# Patient Record
Sex: FEMALE | Race: WHITE
[De-identification: names, ages, dates, MRNs, and addresses within clinical notes are randomized per-mention and may not be internally consistent; named-entity substitution may affect disease eponyms.]

---

## 2020-02-13 ENCOUNTER — HOSPITAL ENCOUNTER (EMERGENCY)
Dept: HOSPITAL 74 - JER | Age: 75
Discharge: HOME | End: 2020-02-13
Payer: COMMERCIAL

## 2020-02-13 VITALS — DIASTOLIC BLOOD PRESSURE: 51 MMHG | TEMPERATURE: 100.1 F | HEART RATE: 92 BPM | SYSTOLIC BLOOD PRESSURE: 104 MMHG

## 2020-02-13 VITALS — BODY MASS INDEX: 20.8 KG/M2

## 2020-02-13 DIAGNOSIS — I10: ICD-10-CM

## 2020-02-13 DIAGNOSIS — J06.9: Primary | ICD-10-CM

## 2020-02-13 DIAGNOSIS — E78.00: ICD-10-CM

## 2020-02-13 LAB
ALBUMIN SERPL-MCNC: 3.2 G/DL (ref 3.4–5)
ALP SERPL-CCNC: 69 U/L (ref 45–117)
ALT SERPL-CCNC: 49 U/L (ref 13–61)
ANION GAP SERPL CALC-SCNC: 9 MMOL/L (ref 8–16)
AST SERPL-CCNC: 78 U/L (ref 15–37)
BASOPHILS # BLD: 0.2 % (ref 0–2)
BILIRUB SERPL-MCNC: 0.4 MG/DL (ref 0.2–1)
BUN SERPL-MCNC: 28.9 MG/DL (ref 7–18)
CALCIUM SERPL-MCNC: 8.6 MG/DL (ref 8.5–10.1)
CHLORIDE SERPL-SCNC: 97 MMOL/L (ref 98–107)
CO2 SERPL-SCNC: 26 MMOL/L (ref 21–32)
CREAT SERPL-MCNC: 1.2 MG/DL (ref 0.55–1.3)
DEPRECATED RDW RBC AUTO: 14.6 % (ref 11.6–15.6)
EOSINOPHIL # BLD: 0 % (ref 0–4.5)
GLUCOSE SERPL-MCNC: 138 MG/DL (ref 74–106)
HCT VFR BLD CALC: 44.1 % (ref 32.4–45.2)
HGB BLD-MCNC: 14.7 GM/DL (ref 10.7–15.3)
LYMPHOCYTES # BLD: 15.7 % (ref 8–40)
MCH RBC QN AUTO: 26.1 PG (ref 25.7–33.7)
MCHC RBC AUTO-ENTMCNC: 33.3 G/DL (ref 32–36)
MCV RBC: 78.4 FL (ref 80–96)
MONOCYTES # BLD AUTO: 11.7 % (ref 3.8–10.2)
NEUTROPHILS # BLD: 72.4 % (ref 42.8–82.8)
PLATELET # BLD AUTO: 169 K/MM3 (ref 134–434)
PMV BLD: 8.7 FL (ref 7.5–11.1)
POTASSIUM SERPLBLD-SCNC: 3.3 MMOL/L (ref 3.5–5.1)
PROT SERPL-MCNC: 7 G/DL (ref 6.4–8.2)
RBC # BLD AUTO: 5.63 M/MM3 (ref 3.6–5.2)
SODIUM SERPL-SCNC: 133 MMOL/L (ref 136–145)
WBC # BLD AUTO: 7.1 K/MM3 (ref 4–10)

## 2020-02-13 PROCEDURE — 3E0F7GC INTRODUCTION OF OTHER THERAPEUTIC SUBSTANCE INTO RESPIRATORY TRACT, VIA NATURAL OR ARTIFICIAL OPENING: ICD-10-PCS

## 2020-02-13 PROCEDURE — 3E0333Z INTRODUCTION OF ANTI-INFLAMMATORY INTO PERIPHERAL VEIN, PERCUTANEOUS APPROACH: ICD-10-PCS

## 2020-02-13 NOTE — PDOC
*Physical Exam





- Vital Signs


 Last Vital Signs











Temp Pulse Resp BP Pulse Ox


 


 100.1 F H  92 H  20   104/51 L  93 L


 


 02/13/20 13:01  02/13/20 13:01  02/13/20 13:01  02/13/20 13:01  02/13/20 13:01














- Physical Exam


General Appearance: Yes: Nourished, Appropriately Dressed.  No: Apparent 

Distress


HEENT: positive: Normal ENT Inspection


Neck: positive: Supple


Respiratory/Chest: negative: Respiratory Distress, Accessory Muscle Use


Cardiovascular: positive: Regular Rhythm, Regular Rate


Musculoskeletal: positive: Normal Inspection


Extremity: positive: Normal Inspection


Integumentary: positive: Normal Color


Neurologic: positive: Fully Oriented, Alert, Normal Response





ED Treatment Course





- LABORATORY


CBC & Chemistry Diagram: 


 02/13/20 15:00





 02/13/20 15:00





- Medications


Given in the ED: 


ED Medications














Discontinued Medications














Generic Name Dose Route Start Last Admin





  Trade Name Freq  PRN Reason Stop Dose Admin


 


Acetaminophen  650 mg  02/13/20 14:13  02/13/20 14:14





  Tylenol -  PO  02/13/20 14:14  650 mg





  ONCE ONE   Administration





     





     





     





     


 


Albuterol/Ipratropium  1 amp  02/13/20 14:13  02/13/20 14:14





  Duoneb -  NEB  02/13/20 14:14  1 amp





  ONCE ONE   Administration





     





     





     





     


 


Albuterol/Ipratropium  1 amp  02/13/20 14:59  02/13/20 15:01





  Duoneb -  NEB  02/13/20 15:00  1 amp





  ONCE ONE   Administration





     





     





     





     


 


Methylprednisolone Sodium Succinate  125 mg  02/13/20 14:59  02/13/20 15:28





  Solu-Medrol -  IVPUSH  02/13/20 15:00  125 mg





  ONCE ONE   Administration





     





     





     





     














Medical Decision Making





- Medical Decision Making





02/13/20 15:44


I assumed care of this 74-year-old female with history of hypertension and 

remote history of smoking presented with complaint of vague symptoms of cough 

and feeling sick with malaise and found to have low temp fever in triage and 

low O2 sat.  Patient given a dose of DuoNeb in fast track with no improvement 

in O2 sat.  Patient in no acute respiratory distress and report no history of 

COPD.  Patient with no wheezing.  Chest x-ray done from fast track shows no 

acute infiltrate or pneumonia.  Patient pending labs and flu result.  Steroids 

ordered from fast track to help with O2 sat.  Treat based on lab results





02/13/20 16:01


Patient pending labs results. Patient asymptomatic and signed out to TEE Scales 

for f/u care





Discharge





- Discharge Information


Problems reviewed: Yes


Clinical Impression/Diagnosis: 


URI (upper respiratory infection)


Qualifiers:


 URI type: unspecified URI Qualified Code(s): J06.9 - Acute upper respiratory 

infection, unspecified





Condition: Stable


Disposition: HOME





- Admission


No





- Follow up/Referral


Referrals: 


Claude Mustafa MD, MD [Staff Physician] - 





- Patient Discharge Instructions


Additional Instructions: 


Call to schedule an appointment with the pulmonologist  within 1 week


Return to ER if fever, chills, shortness of breath, chest pain or any worsening 

symptom





- Post Discharge Activity

## 2020-02-13 NOTE — PDOC
History of Present Illness





- General


Chief Complaint: Cold Symptoms


Stated Complaint: Cold Symptoms


Time Seen by Provider: 02/13/20 13:49


History Source: Patient





- History of Present Illness


Initial Comments: 





02/13/20 14:24


Chief complaint: Cough





Patient is 74-year-old female with history of hypertension, former smoker for 

many years ago with vague complaint of cough, not feeling well.  Denies fever 

although patient was febrile in triage.  Patient denies any chest pain or 

shortness of breath.  Patient does not appear acutely ill.





GENERAL/CONSTITUTIONAL: No fever, weakness. dizziness


HEAD, EYES, EARS, NOSE AND THROAT: No change in vision. No ear pain or 

discharge. No sore throat.


CARDIOVASCULAR: No chest pain


RESPIRATORY: No shortness of breath +cough


GASTROINTESTINAL: No pain, nausea, vomiting, diarrhea or constipation


GENITOURINARY: No dysuria


MUSCULOSKELETAL:  No neck or back pain


SKIN: No rash


NEUROLOGIC: No headache, vertigo, loss of consciousness, or loss of sensation.








GENERAL: The patient is awake, alert, and fully oriented, in no acute distress.


HEAD: Normal with no signs of trauma.


EYES: Pupils equal, round and reactive to light, sclera anicteric, conjunctiva 

clear.


ENT: pharynx: no erythema, no exudate, uvula midline


NECK: supple


CHEST: clear, decreased, nontender, rr


ABD: soft, nontender


BACK: no tenderness or signs of injury


EXTREMITIES: Normal range of motion, no edema.


NEUROLOGICAL: Normal speech, normal gait.


SKIN: Warm, Dry





Past History





- Past Medical History


Allergies/Adverse Reactions: 


 Allergies











Allergy/AdvReac Type Severity Reaction Status Date / Time


 


No Known Drug Allergies Allergy   Verified 02/13/20 12:59











Home Medications: 


Ambulatory Orders





Olmesartan/Hydrochlorothiazide [Benicar Hct 20-12.5 mg Tablet] 1 each PO DAILY 

03/02/15 


Rosuvastatin Calcium [Crestor] 5 mg PO DAILY 03/02/15 


Meloxicam [Mobic] 7.5 mg PO DAILY 04/09/15 








COPD: No


HTN: Yes


Hypercholesterolemia: Yes





- Surgical History


Abdominal Surgery: Yes (LEFT INGUINAL HERNIA REPAIR)


Appendectomy: Yes





- Psycho Social/Smoking Cessation Hx


Smoking History: Current some day smoker


Have you smoked in the past 12 months: Yes


Number of Cigarettes Smoked Daily: 4


Information on smoking cessation initiated: No


Hx Alcohol Use: No


Drug/Substance Use Hx: No


Substance Use Type: None





*Physical Exam





- Vital Signs


 Last Vital Signs











Temp Pulse Resp BP Pulse Ox


 


 100.1 F H  92 H  20   104/51 L  93 L


 


 02/13/20 13:01  02/13/20 13:01  02/13/20 13:01  02/13/20 13:01  02/13/20 13:01














ED Treatment Course





- LABORATORY


CBC & Chemistry Diagram: 


 02/13/20 15:00





 02/13/20 15:00





Medical Decision Making





- Medical Decision Making





02/13/20 14:26


74-year-old female with history of elevated blood pressure, who is had 3 days 

of not feeling well, cough.  No shortness of breath or chest pain..  Patient 

has low-grade fever, slightly tachycardic, no signs of respiratory distress, 

patient able to speak in full sentences.  Patient's pulse ox is a little on the 

low side, it was repeated and remained there.  Patient will get chest x-ray.  3 

days of symptoms so out of the window for Tamiflu.  Will give DuoNeb and 

reassess.





Chest x-ray reviewed by me, no gross signs of failure or infiltrate.  awaiting 

radiologist read. Patient's pulse ox remains 93.  Will order labs, EKG, give 

steroids, signed out to LILI OLIVEIRA for further evaluation.  Flu is pending


02/13/20 17:47








Discharge





- Discharge Information


Problems reviewed: Yes


Clinical Impression/Diagnosis: 


URI (upper respiratory infection)


Qualifiers:


 URI type: unspecified URI Qualified Code(s): J06.9 - Acute upper respiratory 

infection, unspecified





Condition: Stable


Disposition: HOME





- Follow up/Referral


Referrals: 


Claude Mustafa MD, MD [Staff Physician] - 





- Patient Discharge Instructions


Additional Instructions: 


Call to schedule an appointment with the pulmonologist  within 1 week


Return to ER if fever, chills, shortness of breath, chest pain or any worsening 

symptom





- Post Discharge Activity

## 2020-02-13 NOTE — PDOC
*Physical Exam





- Vital Signs


 Last Vital Signs











Temp Pulse Resp BP Pulse Ox


 


 100.1 F H  92 H  20   104/51 L  93 L


 


 02/13/20 13:01  02/13/20 13:01  02/13/20 13:01  02/13/20 13:01  02/13/20 13:01














ED Treatment Course





- LABORATORY


CBC & Chemistry Diagram: 


 02/13/20 15:00





 02/13/20 15:00





- ADDITIONAL ORDERS


Additional order review: 


 Laboratory  Results











  02/13/20 02/13/20





  15:00 15:00


 


Sodium   133 L


 


Potassium   3.3 L


 


Chloride   97 L


 


Carbon Dioxide   26


 


Anion Gap   9


 


BUN   28.9 H


 


Creatinine   1.2


 


Est GFR (CKD-EPI)AfAm   51.20


 


Est GFR (CKD-EPI)NonAf   44.17


 


Random Glucose   138 H


 


Lactic Acid  2.0 


 


Calcium   8.6


 


Total Bilirubin   0.4


 


AST   78 H


 


ALT   49


 


Alkaline Phosphatase   69


 


Creatine Kinase   137


 


Troponin I   < 0.02


 


Total Protein   7.0


 


Albumin   3.2 L








 











  02/13/20





  15:00


 


RBC  5.63 H


 


MCV  78.4 L


 


MCHC  33.3


 


RDW  14.6


 


MPV  8.7


 


Neutrophils %  72.4


 


Lymphocytes %  15.7  D


 


Monocytes %  11.7 H D


 


Eosinophils %  0.0  D


 


Basophils %  0.2














- Medications


Given in the ED: 


ED Medications














Discontinued Medications














Generic Name Dose Route Start Last Admin





  Trade Name Davon  PRN Reason Stop Dose Admin


 


Acetaminophen  650 mg  02/13/20 14:13  02/13/20 14:14





  Tylenol -  PO  02/13/20 14:14  650 mg





  ONCE ONE   Administration





     





     





     





     


 


Albuterol/Ipratropium  1 amp  02/13/20 14:13  02/13/20 14:14





  Duoneb -  NEB  02/13/20 14:14  1 amp





  ONCE ONE   Administration





     





     





     





     


 


Albuterol/Ipratropium  1 amp  02/13/20 14:59  02/13/20 15:01





  Duoneb -  NEB  02/13/20 15:00  1 amp





  ONCE ONE   Administration





     





     





     





     


 


Methylprednisolone Sodium Succinate  125 mg  02/13/20 14:59  02/13/20 15:28





  Solu-Medrol -  IVPUSH  02/13/20 15:00  125 mg





  ONCE ONE   Administration





     





     





     





     














ED Progress Note





- Progress Note


Progress Note: 





02/13/20 16:32


Signed out by TEE Campo


74-year-old female with history of hypertension, asthma quit tobacco smoke 2 

months ago after smoking x22 years presented complaining of dry cough and 

feeling generally unwell for a few days.





Well-appearing, speaking full sentences


Feels better after nebs and steroids


Influenza swab negative


X-ray negative


Labs reviewed


Patient understands that she should follow-up with a pulmonologist given her O2 

sat remains at 93%


However patient does not desat when walking nor does she feel short of breath


Strict return precautions discussed








Discharge





- Discharge Information


Problems reviewed: Yes


Clinical Impression/Diagnosis: 


URI (upper respiratory infection)


Qualifiers:


 URI type: unspecified URI Qualified Code(s): J06.9 - Acute upper respiratory 

infection, unspecified





Condition: Stable


Disposition: HOME





- Admission


No





- Follow up/Referral


Referrals: 


Claude Mustafa MD, MD [Staff Physician] - 





- Patient Discharge Instructions


Additional Instructions: 


Call to schedule an appointment with the pulmonologist  within 1 week


Return to ER if fever, chills, shortness of breath, chest pain or any worsening 

symptom





- Post Discharge Activity